# Patient Record
Sex: MALE | Race: WHITE | NOT HISPANIC OR LATINO | URBAN - METROPOLITAN AREA
[De-identification: names, ages, dates, MRNs, and addresses within clinical notes are randomized per-mention and may not be internally consistent; named-entity substitution may affect disease eponyms.]

---

## 2017-03-20 ENCOUNTER — OFFICE VISIT - HEALTHEAST (OUTPATIENT)
Dept: INTERNAL MEDICINE | Facility: CLINIC | Age: 67
End: 2017-03-20

## 2017-03-20 DIAGNOSIS — R10.9 FLANK PAIN: ICD-10-CM

## 2017-03-20 DIAGNOSIS — K40.90 INGUINAL HERNIA: ICD-10-CM

## 2017-03-20 ASSESSMENT — MIFFLIN-ST. JEOR: SCORE: 1642.16

## 2017-03-21 ENCOUNTER — RECORDS - HEALTHEAST (OUTPATIENT)
Dept: ADMINISTRATIVE | Facility: OTHER | Age: 67
End: 2017-03-21

## 2017-03-28 ENCOUNTER — COMMUNICATION - HEALTHEAST (OUTPATIENT)
Dept: INTERNAL MEDICINE | Facility: CLINIC | Age: 67
End: 2017-03-28

## 2017-03-29 ENCOUNTER — AMBULATORY - HEALTHEAST (OUTPATIENT)
Dept: INTERNAL MEDICINE | Facility: CLINIC | Age: 67
End: 2017-03-29

## 2017-03-29 DIAGNOSIS — K40.90 INGUINAL HERNIA: ICD-10-CM

## 2017-04-14 ENCOUNTER — OFFICE VISIT - HEALTHEAST (OUTPATIENT)
Dept: SURGERY | Facility: CLINIC | Age: 67
End: 2017-04-14

## 2017-04-14 DIAGNOSIS — K44.9 HIATAL HERNIA: ICD-10-CM

## 2017-04-14 DIAGNOSIS — K40.20 BILATERAL INGUINAL HERNIA: ICD-10-CM

## 2017-04-14 ASSESSMENT — MIFFLIN-ST. JEOR: SCORE: 1646.69

## 2017-07-24 ENCOUNTER — COMMUNICATION - HEALTHEAST (OUTPATIENT)
Dept: INTERNAL MEDICINE | Facility: CLINIC | Age: 67
End: 2017-07-24

## 2017-08-09 ENCOUNTER — OFFICE VISIT - HEALTHEAST (OUTPATIENT)
Dept: INTERNAL MEDICINE | Facility: CLINIC | Age: 67
End: 2017-08-09

## 2017-08-09 DIAGNOSIS — Z00.00 HEALTHCARE MAINTENANCE: ICD-10-CM

## 2017-08-09 DIAGNOSIS — M25.561 KNEE PAIN, RIGHT: ICD-10-CM

## 2017-08-09 DIAGNOSIS — Z23 NEED FOR VACCINATION: ICD-10-CM

## 2017-08-09 DIAGNOSIS — I10 ESSENTIAL HYPERTENSION WITH GOAL BLOOD PRESSURE LESS THAN 140/90: ICD-10-CM

## 2017-08-09 DIAGNOSIS — K40.90 INGUINAL HERNIA: ICD-10-CM

## 2017-08-09 DIAGNOSIS — N52.9 ED (ERECTILE DYSFUNCTION): ICD-10-CM

## 2017-08-09 DIAGNOSIS — Z23 NEED FOR PNEUMOCOCCAL VACCINE: ICD-10-CM

## 2017-08-09 LAB
CHOLEST SERPL-MCNC: 169 MG/DL
FASTING STATUS PATIENT QL REPORTED: YES
HDLC SERPL-MCNC: 47 MG/DL
LDLC SERPL CALC-MCNC: 107 MG/DL
PSA SERPL-MCNC: 1 NG/ML (ref 0–4.5)
TRIGL SERPL-MCNC: 77 MG/DL

## 2017-08-09 ASSESSMENT — MIFFLIN-ST. JEOR: SCORE: 1664.84

## 2017-08-10 ENCOUNTER — COMMUNICATION - HEALTHEAST (OUTPATIENT)
Dept: INTERNAL MEDICINE | Facility: CLINIC | Age: 67
End: 2017-08-10

## 2017-08-18 ENCOUNTER — COMMUNICATION - HEALTHEAST (OUTPATIENT)
Dept: INTERNAL MEDICINE | Facility: CLINIC | Age: 67
End: 2017-08-18

## 2017-08-18 DIAGNOSIS — I10 ESSENTIAL HYPERTENSION: ICD-10-CM

## 2017-08-18 RX ORDER — TADALAFIL 20 MG/1
20 TABLET ORAL DAILY PRN
Qty: 4 TABLET | Refills: 0 | Status: SHIPPED | OUTPATIENT
Start: 2017-08-18

## 2018-01-02 ENCOUNTER — COMMUNICATION - HEALTHEAST (OUTPATIENT)
Dept: INTERNAL MEDICINE | Facility: CLINIC | Age: 68
End: 2018-01-02

## 2018-01-02 DIAGNOSIS — I10 ESSENTIAL HYPERTENSION: ICD-10-CM

## 2018-07-20 ENCOUNTER — COMMUNICATION - HEALTHEAST (OUTPATIENT)
Dept: INTERNAL MEDICINE | Facility: CLINIC | Age: 68
End: 2018-07-20

## 2018-07-23 ENCOUNTER — OFFICE VISIT - HEALTHEAST (OUTPATIENT)
Dept: INTERNAL MEDICINE | Facility: CLINIC | Age: 68
End: 2018-07-23

## 2018-07-23 DIAGNOSIS — Z12.5 SCREENING FOR PROSTATE CANCER: ICD-10-CM

## 2018-07-23 DIAGNOSIS — Z00.00 HEALTHCARE MAINTENANCE: ICD-10-CM

## 2018-07-23 DIAGNOSIS — I10 ESSENTIAL HYPERTENSION: ICD-10-CM

## 2018-07-23 LAB
ALBUMIN SERPL-MCNC: 3.9 G/DL (ref 3.5–5)
ALP SERPL-CCNC: 54 U/L (ref 45–120)
ALT SERPL W P-5'-P-CCNC: 20 U/L (ref 0–45)
ANION GAP SERPL CALCULATED.3IONS-SCNC: 7 MMOL/L (ref 5–18)
AST SERPL W P-5'-P-CCNC: 26 U/L (ref 0–40)
BILIRUB SERPL-MCNC: 1.1 MG/DL (ref 0–1)
BUN SERPL-MCNC: 15 MG/DL (ref 8–22)
CALCIUM SERPL-MCNC: 9.7 MG/DL (ref 8.5–10.5)
CHLORIDE BLD-SCNC: 103 MMOL/L (ref 98–107)
CHOLEST SERPL-MCNC: 185 MG/DL
CO2 SERPL-SCNC: 26 MMOL/L (ref 22–31)
CREAT SERPL-MCNC: 1.2 MG/DL (ref 0.7–1.3)
FASTING STATUS PATIENT QL REPORTED: NORMAL
GFR SERPL CREATININE-BSD FRML MDRD: >60 ML/MIN/1.73M2
GLUCOSE BLD-MCNC: 111 MG/DL (ref 70–125)
HDLC SERPL-MCNC: 55 MG/DL
LDLC SERPL CALC-MCNC: 112 MG/DL
POTASSIUM BLD-SCNC: 4.7 MMOL/L (ref 3.5–5)
PROT SERPL-MCNC: 7 G/DL (ref 6–8)
PSA SERPL-MCNC: 1.1 NG/ML (ref 0–4.5)
SODIUM SERPL-SCNC: 136 MMOL/L (ref 136–145)
TRIGL SERPL-MCNC: 89 MG/DL

## 2018-07-23 ASSESSMENT — MIFFLIN-ST. JEOR: SCORE: 1624.01

## 2018-07-24 ENCOUNTER — COMMUNICATION - HEALTHEAST (OUTPATIENT)
Dept: INTERNAL MEDICINE | Facility: CLINIC | Age: 68
End: 2018-07-24

## 2018-09-13 ENCOUNTER — COMMUNICATION - HEALTHEAST (OUTPATIENT)
Dept: INTERNAL MEDICINE | Facility: CLINIC | Age: 68
End: 2018-09-13

## 2018-09-13 DIAGNOSIS — I10 ESSENTIAL HYPERTENSION: ICD-10-CM

## 2019-06-05 ENCOUNTER — RECORDS - HEALTHEAST (OUTPATIENT)
Dept: LAB | Facility: CLINIC | Age: 69
End: 2019-06-05

## 2019-06-05 LAB
ALBUMIN SERPL-MCNC: 4.1 G/DL (ref 3.5–5)
ALP SERPL-CCNC: 56 U/L (ref 45–120)
ALT SERPL W P-5'-P-CCNC: 19 U/L (ref 0–45)
ANION GAP SERPL CALCULATED.3IONS-SCNC: 7 MMOL/L (ref 5–18)
AST SERPL W P-5'-P-CCNC: 26 U/L (ref 0–40)
BILIRUB SERPL-MCNC: 1.6 MG/DL (ref 0–1)
BUN SERPL-MCNC: 20 MG/DL (ref 8–22)
CALCIUM SERPL-MCNC: 10 MG/DL (ref 8.5–10.5)
CHLORIDE BLD-SCNC: 103 MMOL/L (ref 98–107)
CHOLEST SERPL-MCNC: 200 MG/DL
CO2 SERPL-SCNC: 27 MMOL/L (ref 22–31)
CREAT SERPL-MCNC: 1.23 MG/DL (ref 0.7–1.3)
FASTING STATUS PATIENT QL REPORTED: ABNORMAL
GFR SERPL CREATININE-BSD FRML MDRD: 59 ML/MIN/1.73M2
GLUCOSE BLD-MCNC: 107 MG/DL (ref 70–125)
HDLC SERPL-MCNC: 53 MG/DL
LDLC SERPL CALC-MCNC: 127 MG/DL
POTASSIUM BLD-SCNC: 5.1 MMOL/L (ref 3.5–5)
PROT SERPL-MCNC: 7 G/DL (ref 6–8)
PSA SERPL-MCNC: 0.8 NG/ML (ref 0–4.5)
SODIUM SERPL-SCNC: 137 MMOL/L (ref 136–145)
TRIGL SERPL-MCNC: 100 MG/DL

## 2019-08-08 ENCOUNTER — COMMUNICATION - HEALTHEAST (OUTPATIENT)
Dept: INTERNAL MEDICINE | Facility: CLINIC | Age: 69
End: 2019-08-08

## 2019-08-08 DIAGNOSIS — I10 ESSENTIAL HYPERTENSION: ICD-10-CM

## 2019-08-08 RX ORDER — METOPROLOL SUCCINATE 25 MG/1
TABLET, EXTENDED RELEASE ORAL
Qty: 90 TABLET | Refills: 0 | Status: SHIPPED | OUTPATIENT
Start: 2019-08-08

## 2020-01-21 ENCOUNTER — RECORDS - HEALTHEAST (OUTPATIENT)
Dept: LAB | Facility: CLINIC | Age: 70
End: 2020-01-21

## 2020-01-21 LAB
ALBUMIN SERPL-MCNC: 4.2 G/DL (ref 3.5–5)
ALP SERPL-CCNC: 60 U/L (ref 45–120)
ALT SERPL W P-5'-P-CCNC: 27 U/L (ref 0–45)
ANION GAP SERPL CALCULATED.3IONS-SCNC: 12 MMOL/L (ref 5–18)
AST SERPL W P-5'-P-CCNC: 29 U/L (ref 0–40)
BILIRUB SERPL-MCNC: 0.9 MG/DL (ref 0–1)
BUN SERPL-MCNC: 18 MG/DL (ref 8–22)
CALCIUM SERPL-MCNC: 9.8 MG/DL (ref 8.5–10.5)
CHLORIDE BLD-SCNC: 103 MMOL/L (ref 98–107)
CO2 SERPL-SCNC: 22 MMOL/L (ref 22–31)
CREAT SERPL-MCNC: 1.24 MG/DL (ref 0.7–1.3)
GFR SERPL CREATININE-BSD FRML MDRD: 58 ML/MIN/1.73M2
GLUCOSE BLD-MCNC: 103 MG/DL (ref 70–125)
POTASSIUM BLD-SCNC: 4.7 MMOL/L (ref 3.5–5)
PROT SERPL-MCNC: 7.3 G/DL (ref 6–8)
SODIUM SERPL-SCNC: 137 MMOL/L (ref 136–145)

## 2020-09-24 ENCOUNTER — RECORDS - HEALTHEAST (OUTPATIENT)
Dept: LAB | Facility: CLINIC | Age: 70
End: 2020-09-24

## 2020-09-24 LAB
ALBUMIN SERPL-MCNC: 4.1 G/DL (ref 3.5–5)
ALP SERPL-CCNC: 61 U/L (ref 45–120)
ALT SERPL W P-5'-P-CCNC: 22 U/L (ref 0–45)
ANION GAP SERPL CALCULATED.3IONS-SCNC: 8 MMOL/L (ref 5–18)
AST SERPL W P-5'-P-CCNC: 27 U/L (ref 0–40)
BILIRUB SERPL-MCNC: 1.3 MG/DL (ref 0–1)
BUN SERPL-MCNC: 18 MG/DL (ref 8–22)
CALCIUM SERPL-MCNC: 9.9 MG/DL (ref 8.5–10.5)
CHLORIDE BLD-SCNC: 103 MMOL/L (ref 98–107)
CHOLEST SERPL-MCNC: 216 MG/DL
CO2 SERPL-SCNC: 28 MMOL/L (ref 22–31)
CREAT SERPL-MCNC: 1.33 MG/DL (ref 0.7–1.3)
FASTING STATUS PATIENT QL REPORTED: ABNORMAL
GFR SERPL CREATININE-BSD FRML MDRD: 53 ML/MIN/1.73M2
GLUCOSE BLD-MCNC: 116 MG/DL (ref 70–125)
HDLC SERPL-MCNC: 65 MG/DL
LDLC SERPL CALC-MCNC: 131 MG/DL
POTASSIUM BLD-SCNC: 5.5 MMOL/L (ref 3.5–5)
PROT SERPL-MCNC: 7.1 G/DL (ref 6–8)
PSA SERPL-MCNC: 1 NG/ML (ref 0–4.5)
SODIUM SERPL-SCNC: 139 MMOL/L (ref 136–145)
TRIGL SERPL-MCNC: 100 MG/DL

## 2020-11-17 ENCOUNTER — RECORDS - HEALTHEAST (OUTPATIENT)
Dept: LAB | Facility: CLINIC | Age: 70
End: 2020-11-17

## 2020-11-17 LAB
ALBUMIN SERPL-MCNC: 4.3 G/DL (ref 3.5–5)
ALP SERPL-CCNC: 59 U/L (ref 45–120)
ALT SERPL W P-5'-P-CCNC: 19 U/L (ref 0–45)
ANION GAP SERPL CALCULATED.3IONS-SCNC: 7 MMOL/L (ref 5–18)
AST SERPL W P-5'-P-CCNC: 28 U/L (ref 0–40)
BILIRUB SERPL-MCNC: 1.5 MG/DL (ref 0–1)
BUN SERPL-MCNC: 17 MG/DL (ref 8–22)
CALCIUM SERPL-MCNC: 10 MG/DL (ref 8.5–10.5)
CHLORIDE BLD-SCNC: 102 MMOL/L (ref 98–107)
CO2 SERPL-SCNC: 29 MMOL/L (ref 22–31)
CREAT SERPL-MCNC: 1.22 MG/DL (ref 0.7–1.3)
GFR SERPL CREATININE-BSD FRML MDRD: 59 ML/MIN/1.73M2
GLUCOSE BLD-MCNC: 110 MG/DL (ref 70–125)
POTASSIUM BLD-SCNC: 5.6 MMOL/L (ref 3.5–5)
PROT SERPL-MCNC: 7 G/DL (ref 6–8)
SODIUM SERPL-SCNC: 138 MMOL/L (ref 136–145)

## 2020-12-31 ENCOUNTER — RECORDS - HEALTHEAST (OUTPATIENT)
Dept: LAB | Facility: CLINIC | Age: 70
End: 2020-12-31

## 2020-12-31 LAB
ALBUMIN SERPL-MCNC: 3.9 G/DL (ref 3.5–5)
ALP SERPL-CCNC: 70 U/L (ref 45–120)
ALT SERPL W P-5'-P-CCNC: 22 U/L (ref 0–45)
ANION GAP SERPL CALCULATED.3IONS-SCNC: 9 MMOL/L (ref 5–18)
AST SERPL W P-5'-P-CCNC: 28 U/L (ref 0–40)
BILIRUB SERPL-MCNC: 1.5 MG/DL (ref 0–1)
BUN SERPL-MCNC: 18 MG/DL (ref 8–22)
CALCIUM SERPL-MCNC: 9.4 MG/DL (ref 8.5–10.5)
CHLORIDE BLD-SCNC: 101 MMOL/L (ref 98–107)
CO2 SERPL-SCNC: 28 MMOL/L (ref 22–31)
CREAT SERPL-MCNC: 1.35 MG/DL (ref 0.7–1.3)
GFR SERPL CREATININE-BSD FRML MDRD: 52 ML/MIN/1.73M2
GLUCOSE BLD-MCNC: 112 MG/DL (ref 70–125)
POTASSIUM BLD-SCNC: 4.7 MMOL/L (ref 3.5–5)
PROT SERPL-MCNC: 6.7 G/DL (ref 6–8)
SODIUM SERPL-SCNC: 138 MMOL/L (ref 136–145)

## 2021-01-01 LAB — BACTERIA SPEC CULT: NO GROWTH

## 2021-05-30 VITALS — BODY MASS INDEX: 27.63 KG/M2 | HEIGHT: 70 IN | WEIGHT: 193 LBS

## 2021-05-30 VITALS — BODY MASS INDEX: 27.49 KG/M2 | HEIGHT: 70 IN | WEIGHT: 192 LBS

## 2021-05-31 VITALS — WEIGHT: 197 LBS | BODY MASS INDEX: 28.2 KG/M2 | HEIGHT: 70 IN

## 2021-05-31 NOTE — TELEPHONE ENCOUNTER
RN cannot approve Refill Request    RN can NOT refill this medication overdue for office visits and/or labs.    Aries Bahena, [facility] Triage/Med Refill 8/8/2019    Requested Prescriptions   Pending Prescriptions Disp Refills     metoprolol succinate (TOPROL-XL) 25 MG [Pharmacy Med Name: METOPROLOL ER SUCCINATE 25MG TABS] 90 tablet 0     Sig: TAKE 1 TABLET(25 MG) BY MOUTH DAILY       Beta-Blockers Refill Protocol Failed - 8/8/2019  2:06 PM        Failed - PCP or prescribing provider visit in past 12 months or next 3 months     Last office visit with prescriber/PCP: 3/20/2017 Eric Hoffman MD OR same dept: Visit date not found OR same specialty: 3/20/2017 Eric Hoffman MD  Last physical: 7/23/2018 Last MTM visit: Visit date not found   Next visit within 3 mo: Visit date not found  Next physical within 3 mo: Visit date not found  Prescriber OR PCP: Eric Hoffman MD  Last diagnosis associated with med order: 1. Essential hypertension  - metoprolol succinate (TOPROL-XL) 25 MG [Pharmacy Med Name: METOPROLOL ER SUCCINATE 25MG TABS]; TAKE 1 TABLET(25 MG) BY MOUTH DAILY  Dispense: 90 tablet; Refill: 0    If protocol passes may refill for 12 months if within 3 months of last provider visit (or a total of 15 months).             Failed - Blood pressure filed in past 12 months     BP Readings from Last 1 Encounters:   07/23/18 118/60

## 2021-06-01 ENCOUNTER — RECORDS - HEALTHEAST (OUTPATIENT)
Dept: ADMINISTRATIVE | Facility: CLINIC | Age: 71
End: 2021-06-01

## 2021-06-01 VITALS — BODY MASS INDEX: 26.92 KG/M2 | HEIGHT: 70 IN | WEIGHT: 188 LBS

## 2021-06-09 NOTE — PROGRESS NOTES
[facility] Follow Up Note    Rommel Jones   66 y.o. male    Date of Visit: 3/20/2017    Chief Complaint   Patient presents with     Mass     upper right leg, groin area     Subjective  This is a 66-year-old man who is generally in good health.  This morning while in the shower he noticed what he describes as a lump in the right inguinal region.  He also has been complaining of a little nonspecific discomfort in the right flank area.  He denies any urinary symptoms and has had no bowel issues.  He denies any unusual activity or heavy physical work.  He has otherwise been feeling in his usual state of health with no other complaints.  No new medication.    ROS A comprehensive review of systems was performed and was otherwise negative    Medications, allergies, and problem list were reviewed and updated    Exam  General Appearance:   On examination his blood pressure is 118/60.  Weight is 192 pounds and height is 70 inches.  BMI is 27.55.    Heart is in a sinus rhythm with a rate of 72 and no ectopy.    No obvious tenderness or distention along the right flank region.  No abdominal distention.    He does seem to have an inguinal hernia on the right side on examination.  No tenderness in the area.    The patient is alert and oriented ×3.      Assessment/Plan  1. Inguinal hernia     2. Flank pain  CT Abdomen Pelvis With Oral With IV Contrast     Inguinal hernia on the right side.  And some right flank pain.  I thought we would do a CT scan to exclude other issues going on and then consider surgical referral.  The following high BMI interventions were performed this visit: weight monitoring    Eric Hoffman MD      Current Outpatient Prescriptions on File Prior to Visit   Medication Sig     atenolol (TENORMIN) 25 MG tablet TAKE 1 TABLET BY MOUTH DAILY     No current facility-administered medications on file prior to visit.      No Known Allergies  Social History   Substance Use Topics      Smoking status: Never Smoker     Smokeless tobacco: None     Alcohol use Yes

## 2021-06-10 NOTE — PROGRESS NOTES
Here for consult regarding bilateral inguinal hernias.  Pt brought CD from [facility] with CT results and the results are in Media.     Tabatha Vences RN, CBN  [facility]  P [phone]  F [phone]

## 2021-06-10 NOTE — PROGRESS NOTES
HPI:  Rommel Jones is a 66 y.o. male who was referred to me by Eric Hoffman MD for an inguinal hernia. He  presents today with complaints of an asymptomatic bulge in his right groin that he noticed while in the shower.  Does not describe any pain or discomfort stemming from this.  Denies any lump or mass on the left side.  He was evaluated by his primary physician, Dr. Hoffman who arranged for him to undergo a CT to rule out any other pathology given some concomitant flank pain.      No prior history of intra-abdominal surgery.  Does have some mild reflux disease exacerbated by heavy foods or eating close to bedtime.    Allergies:Review of patient's allergies indicates no known allergies.    Past Medical History:   Diagnosis Date     Hypertension        Past Surgical History:   Procedure Laterality Date     MENISCECTOMY Right        CURRENT MEDS:  Current Outpatient Prescriptions   Medication Sig Dispense Refill     atenolol (TENORMIN) 25 MG tablet TAKE 1 TABLET BY MOUTH DAILY 90 tablet 3     No current facility-administered medications for this visit.        History reviewed. No pertinent family history.     reports that he has never smoked. He has never used smokeless tobacco. He reports that he drinks alcohol. He reports that he does not use illicit drugs.    Review of Systems -   The 10 point review of systems  is within normal limits except for as mentioned above in the HPI.  General ROS: No complaints or constitutional symptoms  Skin: No complaints or symptoms   Hematologic/Lymphatic: No symptoms or complaints  Psychiatric: No symptoms or complaints  Endocrine: No excessive fatigue, no hypermetabolic symptoms reported  Respiratory ROS: no cough, shortness of breath, or wheezing  Cardiovascular ROS: no chest pain or dyspnea on exertion  Gastrointestinal ROS: As per HPI  Musculoskeletal ROS: no recent injuries reported  Neurological ROS: no focal neurologic defects reported.        /76  Pulse 64   "Resp 16  Ht 5' 10\" (1.778 m)  Wt 193 lb (87.5 kg)  BMI 27.69 kg/m2  Body mass index is 27.69 kg/(m^2).    EXAM:  General : Alert, cooperative, appears stated age   Skin: Skin color, texture, turgor normal, no rashes or lesions   Lymphatic: No obvious adenopathy, no swelling   Eyes: No scleral icterus, pupils equal  HENT: no traumatic injury to the head or face, no gross abnormalities  Lungs: Normal respiratory effort, breath sounds equal bilaterally  Heart: Regular rate and rhythm  Abdomen: Soft, nondistended.  No palpable mass or bulge in the left groin with Valsalva.  Mildly prominent bulge in the right groin that accentuates with Valsalva.  Musculoskeletal: No obvious swelling  Neurologic: Grossly intact        IMAGES:   Relevant images were reviewed and discussed with the patient.  Notable findings were from CT imaging obtained March 21, 2017.  Bilateral inguinal hernia defects are present, right greater than left.  A moderate hiatal hernia is also present without any evidence of twisting or volvulus      Assessment/Plan:   1. Bilateral inguinal hernia    2. Hiatal hernia        Rommel Jones is a 66 y.o. male with a asymptomatic right inguinal hernia inguinal hernia, geographically present left inguinal hernia, and mildly symptomatic hiatal hernia.  I have discussed the pathophysiology of inguinal hernias at length as well as the  surgical and non-operative management strategies.  In addition, we discussed the connection between his reflux disease as hiatal hernia, as well as indications for repair and dietary changes I can be made to relieve symptoms short of surgical repair.    In particular, the risks and benefits of laparoscopic vs. open inguinal hernia surgery were explained in detail which include, but are not limited to, bleeding, infection of the mesh, recurrence of the hernia, chronic pain, poor cosmesis, the need for reoperative intervention, the possibility of conversion from a laparoscopic " approach to an open approach, subcutaneous emphysema, injury to vital structures,  blood clots, heart attack, stroke and death.  Additionally, the risks of observation were also discussed in detail which include, but are not limited to, chronic pain, enlargement of the hernia, incarceration, strangulation and death.      He understands everything that was discussed and has consented to proceed with surgery.   He would like to wait however until later in the year when is at a more convenient time for him.  A explained that this is perfectly reasonable.  He will contact us at his convenience to schedule a laparoscopic right, possible left inguinal hernia repair at his desired date.       Lucho Phillips MD  [phone]  [facility] Department of Surgery

## 2021-06-12 NOTE — PROGRESS NOTES
Assessment and Plan:   Patient generally appears to be stable.  We will do screening blood work today to include CMP, lipids and PSA.    Inguinal hernia.  This is not new and he has seen the surgeon.  He is asymptomatic at this time and would prefer to wait on the surgery and so we will follow-up as needed.    Erectile dysfunction.  He is hoping to try some Cialis and so I will send in a prescription.    Recurring right knee pain.  He had previous surgery on this 20 years ago.  He is noticing some increased discomfort but would like to hold off on any additional evaluation at this time.    1. Need for pneumococcal vaccine    - Pneumococcal conjugate vaccine 13-valent 6wks-17yrs; >50yrs    2. Need for vaccination  Completed.    3. Essential hypertension with goal blood pressure less than 140/90  Stable.  Continue current use of medication.    4. ED (erectile dysfunction)  We will order Cialis.    5. Knee pain, right  We will follow-up with me if the symptoms are worsening.    6. Inguinal hernia  We will follow-up with surgery if he begins to have symptoms.    7. Healthcare maintenance    - Comprehensive Metabolic Panel  - Lipid Cascade  - PSA (Prostatic-Specific Antigen), Annual Screen      The patient's current medical problems were reviewed.    I have had an Advance Directives discussion with the patient.  The following health maintenance schedule was reviewed with the patient and provided in printed form in the after visit summary:   Health Maintenance   Topic Date Due     ZOSTER VACCINE  08/22/2011     PNEUMOCOCCAL CONJUGATE VACCINE FOR ADULTS (PCV13 OR PREVNAR)  08/22/2016     FALL RISK ASSESSMENT  08/22/2016     INFLUENZA VACCINE RULE BASED (1) 08/01/2017     PNEUMOCOCCAL POLYSACCHARIDE VACCINE AGE 65 AND OVER  09/09/2018 (Originally 8/22/2016)     ADVANCE DIRECTIVES DISCUSSED WITH PATIENT  07/08/2021     TD 18+ HE  05/17/2022     COLONOSCOPY  04/09/2024     TDAP ADULT ONE TIME DOSE  Completed         Subjective:   Chief Complaint: Rommel Jones is an 66 y.o. male here for an Annual Wellness visit.   HPI: This is a 66-year-old man who is generally in good health.  He does have a history of hypertension.  Blood pressures been well controlled and he has been gradually reducing his dose of atenolol so that he is now taking it only every 2 or 3 days.  His blood pressures at home have remained stable.  He remains active and busy.  He is planning to retire at the end of next year.  He does have an inguinal hernia and has seen the surgeon.  He is holding off for now as he is asymptomatic.  He has some ongoing right knee pain which is worsening.  He had prior knee surgery about 20 years ago.  Again, he wants to wait on this but if it worsens will call me for either an MRI or an orthopedic referral.  Lastly he has been having some issues with erectile dysfunction and is hoping he could try some Cialis.  Otherwise she has been feeling good and offers no other particular complaints.    Review of Systems:    Please see above.  The rest of the review of systems are negative for all systems.    Patient Care Team:  Eric Hoffman MD as PCP - General     Patient Active Problem List   Diagnosis     Seborrheic Keratosis     Esophageal Reflux     Fatigue     Essential Hypertension     Renal Insufficiency     Shoulder Tendonitis     Ulcerative Colitis     Past Medical History:   Diagnosis Date     Hypertension       Past Surgical History:   Procedure Laterality Date     MENISCECTOMY Right       No family history on file.   Social History     Social History     Marital status:      Spouse name: N/A     Number of children: N/A     Years of education: N/A     Occupational History     Not on file.     Social History Main Topics     Smoking status: Never Smoker     Smokeless tobacco: Never Used     Alcohol use Yes      Comment: Occasional glass of wine with dinner     Drug use: No     Sexual activity: Not on file     Other  "Topics Concern     Not on file     Social History Narrative      Current Outpatient Prescriptions   Medication Sig Dispense Refill     atenolol (TENORMIN) 25 MG tablet TAKE 1 TABLET BY MOUTH DAILY 90 tablet 3     UNABLE TO FIND 1 tablet. Med Name: citlalli, supplment to help memory       No current facility-administered medications for this visit.       Objective:   Vital Signs:   Visit Vitals     /62     Pulse 64     Ht 5' 10\" (1.778 m)     Wt 197 lb (89.4 kg)     SpO2 98%     BMI 28.27 kg/m2        VisionScreening:  No exam data present     PHYSICAL EXAM  On examination today his blood pressure is 120/62.  Weight is 197 pounds and height is 70 inches.  BMI is 28.27.    Eyes: Pupils are equal and conjunctiva are normal.    Ears nose and throat: External ears canals and tympanic membranes are normal.  Nose and throat are normal.    Neck: Supple with no masses and no neck vein distention.  No thyroid enlargement.    Lungs: Clear.    Cardiovascular: Heart is in a sinus rhythm with a rate of 64 no ectopy.  No gallops or murmurs.  Carotid pulses are full with no bruits.  No peripheral edema.    GI: Abdomen is soft and nontender with no distention.  No masses or organomegaly.    Musculoskeletal: Head and neck are normal to inspection with good range of motion.  Good strength and range of motion in all 4 extremities.    Neurologic: Cranial nerves are intact.  Gait is normal.    Psychiatric: The patient is alert and oriented ×3.  Mood and affect are appropriate.    Assessment Results 8/9/2017   Activities of Daily Living No help needed   Instrumental Activities of Daily Living No help needed   Get Up and Go Score Less than 12 seconds   Mini Cog Total Score 5   Some recent data might be hidden     A Mini-Cog score of 0-2 suggests the possibility of dementia, score of 3-5 suggests no dementia    Identified Health Risks:     The patient was counseled and encouraged to consider modifying their diet and eating habits. He " was provided with information on recommended healthy diet options.  Patient's advanced directive was discussed and I am comfortable with the patient's wishes.

## 2021-06-19 NOTE — PROGRESS NOTES
Assessment and Plan:   Annual wellness visit.  All information relative to the annual wellness visit was reviewed.  There are no new issues.  The patient is active and busy.  He will be retiring at the end of the year but plans to remain active.    Hypertension.  He has been gradually tapering off of his beta-blocker.  He is now taking his metoprolol every third day and his blood pressure remains good both at home and here.  He will probably discontinue the metoprolol within the next month if he remains stable at home.  I advised him to check back with us 6-8 weeks after stopping it to make sure that his blood pressure remains good.    Erectile dysfunction.  Stable.  Continues to use Cialis as needed.    Inguinal hernia.  Unchanged.  As he is asymptomatic he is not too worried about this but may get it repaired prior to a planned trip to [place] in 2020.        The patient's current medical problems were reviewed.    I have had an Advance Directives discussion with the patient.  The following health maintenance schedule was reviewed with the patient and provided in printed form in the after visit summary:   Health Maintenance   Topic Date Due     ZOSTER VACCINE  08/22/2011     FALL RISK ASSESSMENT  08/09/2018     PNEUMOCOCCAL POLYSACCHARIDE VACCINE AGE 65 AND OVER  09/09/2018 (Originally 8/22/2016)     INFLUENZA VACCINE RULE BASED (1) 08/01/2018     TD 18+ HE  05/17/2022     ADVANCE DIRECTIVES DISCUSSED WITH PATIENT  08/09/2022     COLONOSCOPY  04/09/2024     PNEUMOCOCCAL CONJUGATE VACCINE FOR ADULTS (PCV13 OR PREVNAR)  Completed        Subjective:   Chief Complaint: Rommel Jones is an 67 y.o. male here for an Annual Wellness visit.   HPI: This is a 67-year-old man who comes in for his annual wellness visit.  He is generally healthy.  He has had some history of hypertension and is on a low dose of metoprolol.  He has been trying to gradually taper off of this and is now taking it only every second or third day.   Blood pressures at home have remained good and he has no symptoms relative to the blood pressure.  He has some intermittent mild discomfort in his right knee but it is not preventing him from doing significant amounts of hiking.  He has been diagnosed with an inguinal hernia and is previously consulted with surgeons.  He is holding off on repair as he is asymptomatic but he does have a large walking tour in [place] schedule for the year 2020 and he is considering having it repaired before that trip.  He has some ongoing mild erectile dysfunction and uses Cialis as needed.  No other particular complaints or issues at this time.  No other medications.    Review of Systems:    Please see above.  The rest of the review of systems are negative for all systems.    Patient Care Team:  Eric Hoffman MD as PCP - General     Patient Active Problem List   Diagnosis     Seborrheic Keratosis     Esophageal Reflux     Fatigue     Essential Hypertension     Renal Insufficiency     Shoulder Tendonitis     Ulcerative Colitis     Past Medical History:   Diagnosis Date     Hypertension       Past Surgical History:   Procedure Laterality Date     MENISCECTOMY Right       No family history on file.   Social History     Social History     Marital status:      Spouse name: N/A     Number of children: N/A     Years of education: N/A     Occupational History     Not on file.     Social History Main Topics     Smoking status: Never Smoker     Smokeless tobacco: Never Used     Alcohol use Yes      Comment: Occasional glass of wine with dinner     Drug use: No     Sexual activity: Not on file     Other Topics Concern     Not on file     Social History Narrative      Current Outpatient Prescriptions   Medication Sig Dispense Refill     metoprolol succinate (TOPROL-XL) 25 MG TAKE 1 TABLET(25 MG) BY MOUTH DAILY (Patient taking differently: TAKE 1 TABLET(25 MG) every 3 days) 90 tablet 1     tadalafil (CIALIS) 20 MG tablet Take 1 tablet (20  "mg total) by mouth daily as needed for erectile dysfunction. 4 tablet 0     UNABLE TO FIND 1 Powder. Med Name: citlalli, supplment to help memory        atenolol (TENORMIN) 25 MG tablet TAKE 1 TABLET BY MOUTH DAILY 90 tablet 0     No current facility-administered medications for this visit.       Objective:   Vital Signs:   Visit Vitals     /60     Pulse 60     Ht 5' 10\" (1.778 m)     Wt 188 lb (85.3 kg)     SpO2 99%     BMI 26.98 kg/m2        VisionScreening:  No exam data present     PHYSICAL EXAM  On examination today his blood pressure is 118/60.  Weight is 188 pounds and height is 70 inches.    Eyes: Pupils are equal and conjunctiva are normal.    Ears nose and throat: External ears canals and tympanic membranes are normal.  Nose and throat are normal.    Neck: Supple with no masses and no neck vein distention.  No thyroid enlargement.    Lungs: Clear.    Cardiovascular: Heart is in a sinus rhythm with a rate of 60 and no ectopy.  No gallops or murmurs.  Carotid pulses are full with no bruits.  No peripheral edema.    GI: Abdomen is soft and nontender with no distention.  No masses organomegaly.  No change in inguinal hernia.    Musculoskeletal: Head and neck are normal to inspection with good range of motion.  Good strength and range of motion in all 4 extremities.    Neurologic: Cranial nerves are intact.  Gait is normal.    Psychiatric: The patient is alert and oriented ×3.  Mood and affect are appropriate.    Assessment Results 7/23/2018   Activities of Daily Living No help needed   Instrumental Activities of Daily Living No help needed   Get Up and Go Score Less than 12 seconds   Mini Cog Total Score 5   Some recent data might be hidden     A Mini-Cog score of 0-2 suggests the possibility of dementia, score of 3-5 suggests no dementia    Identified Health Risks:     Information regarding advance directives ([place]), including where he can download the appropriate form, was provided to the patient " via the AVS.

## 2021-08-21 ENCOUNTER — HEALTH MAINTENANCE LETTER (OUTPATIENT)
Age: 71
End: 2021-08-21

## 2021-10-16 ENCOUNTER — HEALTH MAINTENANCE LETTER (OUTPATIENT)
Age: 71
End: 2021-10-16

## 2022-10-01 ENCOUNTER — HEALTH MAINTENANCE LETTER (OUTPATIENT)
Age: 72
End: 2022-10-01

## 2023-10-15 ENCOUNTER — HEALTH MAINTENANCE LETTER (OUTPATIENT)
Age: 73
End: 2023-10-15